# Patient Record
Sex: FEMALE | URBAN - METROPOLITAN AREA
[De-identification: names, ages, dates, MRNs, and addresses within clinical notes are randomized per-mention and may not be internally consistent; named-entity substitution may affect disease eponyms.]

---

## 2024-02-07 ENCOUNTER — MEDICAL CORRESPONDENCE (OUTPATIENT)
Dept: HEALTH INFORMATION MANAGEMENT | Facility: CLINIC | Age: 44
End: 2024-02-07

## 2024-02-08 ENCOUNTER — TELEPHONE (OUTPATIENT)
Dept: CARDIOLOGY | Facility: CLINIC | Age: 44
End: 2024-02-08

## 2024-02-08 DIAGNOSIS — R40.4 SPELL OF ALTERED CONSCIOUSNESS: Primary | ICD-10-CM

## 2024-02-08 NOTE — TELEPHONE ENCOUNTER
02/08/24 EP referral recd from [facility] for possible Tilt Table. Attempted to call pt, no answer and no VM set up.  Faxed records request to [facility] . Awaiting documents  Joel 1 pm

## 2025-02-20 ENCOUNTER — APPOINTMENT (OUTPATIENT)
Dept: ULTRASOUND IMAGING | Facility: CLINIC | Age: 45
End: 2025-02-20
Attending: EMERGENCY MEDICINE
Payer: COMMERCIAL

## 2025-02-20 ENCOUNTER — HOSPITAL ENCOUNTER (OUTPATIENT)
Facility: CLINIC | Age: 45
End: 2025-02-20
Attending: SURGERY | Admitting: SURGERY
Payer: COMMERCIAL

## 2025-02-20 ENCOUNTER — ANESTHESIA EVENT (OUTPATIENT)
Dept: SURGERY | Facility: CLINIC | Age: 45
End: 2025-02-20
Payer: COMMERCIAL

## 2025-02-20 ENCOUNTER — ANESTHESIA (OUTPATIENT)
Dept: SURGERY | Facility: CLINIC | Age: 45
End: 2025-02-20
Payer: COMMERCIAL

## 2025-02-20 ENCOUNTER — HOSPITAL ENCOUNTER (OUTPATIENT)
Facility: CLINIC | Age: 45
Setting detail: OBSERVATION
Discharge: HOME | End: 2025-02-20
Attending: EMERGENCY MEDICINE | Admitting: INTERNAL MEDICINE
Payer: COMMERCIAL

## 2025-02-20 VITALS
SYSTOLIC BLOOD PRESSURE: 110 MMHG | TEMPERATURE: 98.2 F | HEIGHT: 65 IN | RESPIRATION RATE: 14 BRPM | OXYGEN SATURATION: 97 % | BODY MASS INDEX: 29.49 KG/M2 | HEART RATE: 71 BPM | WEIGHT: 177.03 LBS | DIASTOLIC BLOOD PRESSURE: 77 MMHG

## 2025-02-20 DIAGNOSIS — R10.11 RIGHT UPPER QUADRANT ABDOMINAL PAIN: ICD-10-CM

## 2025-02-20 DIAGNOSIS — K81.0 ACUTE CHOLECYSTITIS: ICD-10-CM

## 2025-02-20 DIAGNOSIS — D72.825 BANDEMIA: ICD-10-CM

## 2025-02-20 LAB
ALBUMIN SERPL BCG-MCNC: 4.1 G/DL (ref 3.5–5.2)
ALP SERPL-CCNC: 48 U/L (ref 40–150)
ALT SERPL W P-5'-P-CCNC: 14 U/L (ref 0–50)
ANION GAP SERPL CALCULATED.3IONS-SCNC: 7 MMOL/L (ref 7–15)
AST SERPL W P-5'-P-CCNC: 17 U/L (ref 0–45)
BASOPHILS # BLD AUTO: 0 10E3/UL (ref 0–0.2)
BASOPHILS NFR BLD AUTO: 0 %
BILIRUB SERPL-MCNC: 0.2 MG/DL
BUN SERPL-MCNC: 11.7 MG/DL (ref 6–20)
CALCIUM SERPL-MCNC: 9.2 MG/DL (ref 8.8–10.4)
CHLORIDE SERPL-SCNC: 102 MMOL/L (ref 98–107)
CREAT SERPL-MCNC: 0.63 MG/DL (ref 0.51–0.95)
EGFRCR SERPLBLD CKD-EPI 2021: >90 ML/MIN/1.73M2
EOSINOPHIL # BLD AUTO: 0.2 10E3/UL (ref 0–0.7)
EOSINOPHIL NFR BLD AUTO: 1 %
ERYTHROCYTE [DISTWIDTH] IN BLOOD BY AUTOMATED COUNT: 13.2 % (ref 10–15)
GLUCOSE SERPL-MCNC: 118 MG/DL (ref 70–99)
HCO3 SERPL-SCNC: 24 MMOL/L (ref 22–29)
HCT VFR BLD AUTO: 37.1 % (ref 35–47)
HGB BLD-MCNC: 12.3 G/DL (ref 11.7–15.7)
HOLD SPECIMEN: NORMAL
HOLD SPECIMEN: NORMAL
IMM GRANULOCYTES # BLD: 0.1 10E3/UL
IMM GRANULOCYTES NFR BLD: 0 %
LIPASE SERPL-CCNC: 31 U/L (ref 13–60)
LYMPHOCYTES # BLD AUTO: 1.9 10E3/UL (ref 0.8–5.3)
LYMPHOCYTES NFR BLD AUTO: 15 %
MCH RBC QN AUTO: 27.8 PG (ref 26.5–33)
MCHC RBC AUTO-ENTMCNC: 33.2 G/DL (ref 31.5–36.5)
MCV RBC AUTO: 84 FL (ref 78–100)
MONOCYTES # BLD AUTO: 0.6 10E3/UL (ref 0–1.3)
MONOCYTES NFR BLD AUTO: 5 %
NEUTROPHILS # BLD AUTO: 9.6 10E3/UL (ref 1.6–8.3)
NEUTROPHILS NFR BLD AUTO: 78 %
NRBC # BLD AUTO: 0 10E3/UL
NRBC BLD AUTO-RTO: 0 /100
PLATELET # BLD AUTO: 310 10E3/UL (ref 150–450)
POTASSIUM SERPL-SCNC: 3.7 MMOL/L (ref 3.4–5.3)
PROT SERPL-MCNC: 7.1 G/DL (ref 6.4–8.3)
RBC # BLD AUTO: 4.43 10E6/UL (ref 3.8–5.2)
SODIUM SERPL-SCNC: 133 MMOL/L (ref 135–145)
WBC # BLD AUTO: 12.3 10E3/UL (ref 4–11)

## 2025-02-20 PROCEDURE — 258N000003 HC RX IP 258 OP 636

## 2025-02-20 PROCEDURE — 258N000003 HC RX IP 258 OP 636: Performed by: ANESTHESIOLOGY

## 2025-02-20 PROCEDURE — 85025 COMPLETE CBC W/AUTO DIFF WBC: CPT | Performed by: EMERGENCY MEDICINE

## 2025-02-20 PROCEDURE — 710N000012 HC RECOVERY PHASE 2, PER MINUTE: Performed by: SURGERY

## 2025-02-20 PROCEDURE — 96375 TX/PRO/DX INJ NEW DRUG ADDON: CPT

## 2025-02-20 PROCEDURE — 250N000009 HC RX 250

## 2025-02-20 PROCEDURE — 370N000017 HC ANESTHESIA TECHNICAL FEE, PER MIN: Performed by: SURGERY

## 2025-02-20 PROCEDURE — G0378 HOSPITAL OBSERVATION PER HR: HCPCS

## 2025-02-20 PROCEDURE — 258N000001 HC RX 258: Performed by: SURGERY

## 2025-02-20 PROCEDURE — 96361 HYDRATE IV INFUSION ADD-ON: CPT

## 2025-02-20 PROCEDURE — 250N000025 HC SEVOFLURANE, PER MIN: Performed by: SURGERY

## 2025-02-20 PROCEDURE — 250N000011 HC RX IP 250 OP 636: Performed by: SURGERY

## 2025-02-20 PROCEDURE — 80053 COMPREHEN METABOLIC PANEL: CPT | Performed by: EMERGENCY MEDICINE

## 2025-02-20 PROCEDURE — 250N000013 HC RX MED GY IP 250 OP 250 PS 637: Performed by: ANESTHESIOLOGY

## 2025-02-20 PROCEDURE — 250N000009 HC RX 250: Performed by: SURGERY

## 2025-02-20 PROCEDURE — 360N000076 HC SURGERY LEVEL 3, PER MIN: Performed by: SURGERY

## 2025-02-20 PROCEDURE — 88304 TISSUE EXAM BY PATHOLOGIST: CPT | Mod: TC | Performed by: SURGERY

## 2025-02-20 PROCEDURE — 250N000013 HC RX MED GY IP 250 OP 250 PS 637: Performed by: INTERNAL MEDICINE

## 2025-02-20 PROCEDURE — 47562 LAPAROSCOPIC CHOLECYSTECTOMY: CPT | Mod: AS | Performed by: PHYSICIAN ASSISTANT

## 2025-02-20 PROCEDURE — 47562 LAPAROSCOPIC CHOLECYSTECTOMY: CPT | Performed by: SURGERY

## 2025-02-20 PROCEDURE — 85041 AUTOMATED RBC COUNT: CPT | Performed by: EMERGENCY MEDICINE

## 2025-02-20 PROCEDURE — 999N000141 HC STATISTIC PRE-PROCEDURE NURSING ASSESSMENT: Performed by: SURGERY

## 2025-02-20 PROCEDURE — 83690 ASSAY OF LIPASE: CPT | Performed by: EMERGENCY MEDICINE

## 2025-02-20 PROCEDURE — 85018 HEMOGLOBIN: CPT | Performed by: EMERGENCY MEDICINE

## 2025-02-20 PROCEDURE — 88304 TISSUE EXAM BY PATHOLOGIST: CPT | Mod: 26 | Performed by: PATHOLOGY

## 2025-02-20 PROCEDURE — 99204 OFFICE O/P NEW MOD 45 MIN: CPT | Mod: 57 | Performed by: SURGERY

## 2025-02-20 PROCEDURE — 710N000009 HC RECOVERY PHASE 1, LEVEL 1, PER MIN: Performed by: SURGERY

## 2025-02-20 PROCEDURE — 36415 COLL VENOUS BLD VENIPUNCTURE: CPT | Performed by: EMERGENCY MEDICINE

## 2025-02-20 PROCEDURE — 250N000011 HC RX IP 250 OP 636: Mod: JZ | Performed by: EMERGENCY MEDICINE

## 2025-02-20 PROCEDURE — 96374 THER/PROPH/DIAG INJ IV PUSH: CPT | Mod: 59

## 2025-02-20 PROCEDURE — 99222 1ST HOSP IP/OBS MODERATE 55: CPT | Performed by: INTERNAL MEDICINE

## 2025-02-20 PROCEDURE — 272N000001 HC OR GENERAL SUPPLY STERILE: Performed by: SURGERY

## 2025-02-20 PROCEDURE — 99207 PR APP CREDIT; MD BILLING SHARED VISIT: CPT | Performed by: INTERNAL MEDICINE

## 2025-02-20 PROCEDURE — 258N000003 HC RX IP 258 OP 636: Performed by: EMERGENCY MEDICINE

## 2025-02-20 PROCEDURE — 76705 ECHO EXAM OF ABDOMEN: CPT

## 2025-02-20 PROCEDURE — 99285 EMERGENCY DEPT VISIT HI MDM: CPT | Mod: 25

## 2025-02-20 PROCEDURE — 258N000003 HC RX IP 258 OP 636: Performed by: INTERNAL MEDICINE

## 2025-02-20 PROCEDURE — 250N000011 HC RX IP 250 OP 636: Mod: JZ | Performed by: SURGERY

## 2025-02-20 PROCEDURE — 250N000011 HC RX IP 250 OP 636

## 2025-02-20 RX ORDER — ACETAMINOPHEN 650 MG/1
650 SUPPOSITORY RECTAL EVERY 4 HOURS PRN
Status: DISCONTINUED | OUTPATIENT
Start: 2025-02-20 | End: 2025-02-20 | Stop reason: HOSPADM

## 2025-02-20 RX ORDER — KETOROLAC TROMETHAMINE 15 MG/ML
15 INJECTION, SOLUTION INTRAMUSCULAR; INTRAVENOUS
Status: DISCONTINUED | OUTPATIENT
Start: 2025-02-20 | End: 2025-02-20 | Stop reason: HOSPADM

## 2025-02-20 RX ORDER — BUPIVACAINE HYDROCHLORIDE 5 MG/ML
INJECTION, SOLUTION EPIDURAL; INTRACAUDAL PRN
Status: DISCONTINUED | OUTPATIENT
Start: 2025-02-20 | End: 2025-02-20 | Stop reason: HOSPADM

## 2025-02-20 RX ORDER — ONDANSETRON 4 MG/1
4 TABLET, ORALLY DISINTEGRATING ORAL EVERY 30 MIN PRN
Status: DISCONTINUED | OUTPATIENT
Start: 2025-02-20 | End: 2025-02-20 | Stop reason: HOSPADM

## 2025-02-20 RX ORDER — PROPRANOLOL HYDROCHLORIDE 10 MG/1
10 TABLET ORAL DAILY
Status: DISCONTINUED | OUTPATIENT
Start: 2025-02-20 | End: 2025-02-20 | Stop reason: HOSPADM

## 2025-02-20 RX ORDER — SODIUM CHLORIDE, SODIUM LACTATE, POTASSIUM CHLORIDE, CALCIUM CHLORIDE 600; 310; 30; 20 MG/100ML; MG/100ML; MG/100ML; MG/100ML
INJECTION, SOLUTION INTRAVENOUS CONTINUOUS
Status: DISCONTINUED | OUTPATIENT
Start: 2025-02-20 | End: 2025-02-20 | Stop reason: HOSPADM

## 2025-02-20 RX ORDER — PROPOFOL 10 MG/ML
INJECTION, EMULSION INTRAVENOUS PRN
Status: DISCONTINUED | OUTPATIENT
Start: 2025-02-20 | End: 2025-02-20

## 2025-02-20 RX ORDER — OXYCODONE HYDROCHLORIDE 5 MG/1
5-10 TABLET ORAL EVERY 4 HOURS PRN
Qty: 12 TABLET | Refills: 0 | Status: SHIPPED | OUTPATIENT
Start: 2025-02-20

## 2025-02-20 RX ORDER — LIDOCAINE 40 MG/G
CREAM TOPICAL
Status: DISCONTINUED | OUTPATIENT
Start: 2025-02-20 | End: 2025-02-20 | Stop reason: HOSPADM

## 2025-02-20 RX ORDER — CEFTRIAXONE 2 G/1
2 INJECTION, POWDER, FOR SOLUTION INTRAMUSCULAR; INTRAVENOUS EVERY 24 HOURS
Status: DISCONTINUED | OUTPATIENT
Start: 2025-02-21 | End: 2025-02-20 | Stop reason: HOSPADM

## 2025-02-20 RX ORDER — KETOROLAC TROMETHAMINE 15 MG/ML
10 INJECTION, SOLUTION INTRAMUSCULAR; INTRAVENOUS ONCE
Status: COMPLETED | OUTPATIENT
Start: 2025-02-20 | End: 2025-02-20

## 2025-02-20 RX ORDER — HYDROMORPHONE HYDROCHLORIDE 2 MG/1
2 TABLET ORAL EVERY 4 HOURS PRN
Status: DISCONTINUED | OUTPATIENT
Start: 2025-02-20 | End: 2025-02-20 | Stop reason: HOSPADM

## 2025-02-20 RX ORDER — PROCHLORPERAZINE MALEATE 5 MG/1
10 TABLET ORAL EVERY 6 HOURS PRN
Status: DISCONTINUED | OUTPATIENT
Start: 2025-02-20 | End: 2025-02-20 | Stop reason: HOSPADM

## 2025-02-20 RX ORDER — ACETAMINOPHEN 500 MG
500-1000 TABLET ORAL EVERY 6 HOURS PRN
COMMUNITY
Start: 2025-02-20

## 2025-02-20 RX ORDER — FENTANYL CITRATE 50 UG/ML
25 INJECTION, SOLUTION INTRAMUSCULAR; INTRAVENOUS
Status: DISCONTINUED | OUTPATIENT
Start: 2025-02-20 | End: 2025-02-20 | Stop reason: HOSPADM

## 2025-02-20 RX ORDER — NALOXONE HYDROCHLORIDE 0.4 MG/ML
0.1 INJECTION, SOLUTION INTRAMUSCULAR; INTRAVENOUS; SUBCUTANEOUS
Status: DISCONTINUED | OUTPATIENT
Start: 2025-02-20 | End: 2025-02-20 | Stop reason: HOSPADM

## 2025-02-20 RX ORDER — ONDANSETRON 2 MG/ML
INJECTION INTRAMUSCULAR; INTRAVENOUS PRN
Status: DISCONTINUED | OUTPATIENT
Start: 2025-02-20 | End: 2025-02-20

## 2025-02-20 RX ORDER — ACETAMINOPHEN 325 MG/1
650 TABLET ORAL EVERY 4 HOURS PRN
Status: DISCONTINUED | OUTPATIENT
Start: 2025-02-20 | End: 2025-02-20 | Stop reason: HOSPADM

## 2025-02-20 RX ORDER — HYDROMORPHONE HYDROCHLORIDE 1 MG/ML
0.5 INJECTION, SOLUTION INTRAMUSCULAR; INTRAVENOUS; SUBCUTANEOUS EVERY 30 MIN PRN
Status: DISCONTINUED | OUTPATIENT
Start: 2025-02-20 | End: 2025-02-20

## 2025-02-20 RX ORDER — CEFAZOLIN SODIUM/WATER 2 G/20 ML
2 SYRINGE (ML) INTRAVENOUS
Status: COMPLETED | OUTPATIENT
Start: 2025-02-20 | End: 2025-02-20

## 2025-02-20 RX ORDER — INDOCYANINE GREEN AND WATER 25 MG
2.5 KIT INJECTION ONCE
Status: COMPLETED | OUTPATIENT
Start: 2025-02-20 | End: 2025-02-20

## 2025-02-20 RX ORDER — OXYCODONE HYDROCHLORIDE 5 MG/1
5 TABLET ORAL EVERY 4 HOURS PRN
Status: DISCONTINUED | OUTPATIENT
Start: 2025-02-20 | End: 2025-02-20 | Stop reason: HOSPADM

## 2025-02-20 RX ORDER — HYDROMORPHONE HCL IN WATER/PF 6 MG/30 ML
0.2 PATIENT CONTROLLED ANALGESIA SYRINGE INTRAVENOUS EVERY 5 MIN PRN
Status: DISCONTINUED | OUTPATIENT
Start: 2025-02-20 | End: 2025-02-20 | Stop reason: HOSPADM

## 2025-02-20 RX ORDER — VENLAFAXINE HYDROCHLORIDE 150 MG/1
150 CAPSULE, EXTENDED RELEASE ORAL
Status: DISCONTINUED | OUTPATIENT
Start: 2025-02-20 | End: 2025-02-20 | Stop reason: HOSPADM

## 2025-02-20 RX ORDER — HYDROMORPHONE HCL IN WATER/PF 6 MG/30 ML
0.4 PATIENT CONTROLLED ANALGESIA SYRINGE INTRAVENOUS EVERY 5 MIN PRN
Status: DISCONTINUED | OUTPATIENT
Start: 2025-02-20 | End: 2025-02-20 | Stop reason: HOSPADM

## 2025-02-20 RX ORDER — ONDANSETRON 2 MG/ML
4 INJECTION INTRAMUSCULAR; INTRAVENOUS EVERY 6 HOURS PRN
Status: DISCONTINUED | OUTPATIENT
Start: 2025-02-20 | End: 2025-02-20 | Stop reason: HOSPADM

## 2025-02-20 RX ORDER — AMOXICILLIN 250 MG
1 CAPSULE ORAL 2 TIMES DAILY PRN
Status: DISCONTINUED | OUTPATIENT
Start: 2025-02-20 | End: 2025-02-20 | Stop reason: HOSPADM

## 2025-02-20 RX ORDER — DEXAMETHASONE SODIUM PHOSPHATE 4 MG/ML
INJECTION, SOLUTION INTRA-ARTICULAR; INTRALESIONAL; INTRAMUSCULAR; INTRAVENOUS; SOFT TISSUE PRN
Status: DISCONTINUED | OUTPATIENT
Start: 2025-02-20 | End: 2025-02-20

## 2025-02-20 RX ORDER — ACETAMINOPHEN 325 MG/1
975 TABLET ORAL ONCE
Status: COMPLETED | OUTPATIENT
Start: 2025-02-20 | End: 2025-02-20

## 2025-02-20 RX ORDER — ONDANSETRON 2 MG/ML
4 INJECTION INTRAMUSCULAR; INTRAVENOUS EVERY 30 MIN PRN
Status: DISCONTINUED | OUTPATIENT
Start: 2025-02-20 | End: 2025-02-20 | Stop reason: HOSPADM

## 2025-02-20 RX ORDER — ACETAMINOPHEN 325 MG/1
975 TABLET ORAL
Status: DISCONTINUED | OUTPATIENT
Start: 2025-02-20 | End: 2025-02-20 | Stop reason: HOSPADM

## 2025-02-20 RX ORDER — FENTANYL CITRATE 50 UG/ML
INJECTION, SOLUTION INTRAMUSCULAR; INTRAVENOUS PRN
Status: DISCONTINUED | OUTPATIENT
Start: 2025-02-20 | End: 2025-02-20

## 2025-02-20 RX ORDER — OXYCODONE HYDROCHLORIDE 5 MG/1
5 TABLET ORAL
Status: DISCONTINUED | OUTPATIENT
Start: 2025-02-20 | End: 2025-02-20 | Stop reason: HOSPADM

## 2025-02-20 RX ORDER — ACETAMINOPHEN 325 MG/1
650 TABLET ORAL
Status: DISCONTINUED | OUTPATIENT
Start: 2025-02-20 | End: 2025-02-20 | Stop reason: HOSPADM

## 2025-02-20 RX ORDER — DEXAMETHASONE SODIUM PHOSPHATE 4 MG/ML
4 INJECTION, SOLUTION INTRA-ARTICULAR; INTRALESIONAL; INTRAMUSCULAR; INTRAVENOUS; SOFT TISSUE
Status: DISCONTINUED | OUTPATIENT
Start: 2025-02-20 | End: 2025-02-20 | Stop reason: HOSPADM

## 2025-02-20 RX ORDER — POLYETHYLENE GLYCOL 3350 17 G
2 POWDER IN PACKET (EA) ORAL
Status: DISCONTINUED | OUTPATIENT
Start: 2025-02-20 | End: 2025-02-20 | Stop reason: HOSPADM

## 2025-02-20 RX ORDER — LABETALOL HYDROCHLORIDE 5 MG/ML
10 INJECTION, SOLUTION INTRAVENOUS
Status: DISCONTINUED | OUTPATIENT
Start: 2025-02-20 | End: 2025-02-20 | Stop reason: HOSPADM

## 2025-02-20 RX ORDER — CEFTRIAXONE 1 G/1
1 INJECTION, POWDER, FOR SOLUTION INTRAMUSCULAR; INTRAVENOUS ONCE
Status: DISCONTINUED | OUTPATIENT
Start: 2025-02-20 | End: 2025-02-20

## 2025-02-20 RX ORDER — LIDOCAINE HYDROCHLORIDE 20 MG/ML
INJECTION, SOLUTION INFILTRATION; PERINEURAL PRN
Status: DISCONTINUED | OUTPATIENT
Start: 2025-02-20 | End: 2025-02-20

## 2025-02-20 RX ORDER — HYDROMORPHONE HYDROCHLORIDE 1 MG/ML
0.5 INJECTION, SOLUTION INTRAMUSCULAR; INTRAVENOUS; SUBCUTANEOUS EVERY 4 HOURS PRN
Status: DISCONTINUED | OUTPATIENT
Start: 2025-02-20 | End: 2025-02-20 | Stop reason: HOSPADM

## 2025-02-20 RX ORDER — BUPROPION HYDROCHLORIDE 150 MG/1
150 TABLET ORAL DAILY
Status: DISCONTINUED | OUTPATIENT
Start: 2025-02-20 | End: 2025-02-20 | Stop reason: HOSPADM

## 2025-02-20 RX ORDER — KETOROLAC TROMETHAMINE 30 MG/ML
INJECTION, SOLUTION INTRAMUSCULAR; INTRAVENOUS PRN
Status: DISCONTINUED | OUTPATIENT
Start: 2025-02-20 | End: 2025-02-20

## 2025-02-20 RX ORDER — GLYCOPYRROLATE 0.2 MG/ML
INJECTION, SOLUTION INTRAMUSCULAR; INTRAVENOUS PRN
Status: DISCONTINUED | OUTPATIENT
Start: 2025-02-20 | End: 2025-02-20

## 2025-02-20 RX ORDER — ALBUTEROL SULFATE 0.83 MG/ML
2.5 SOLUTION RESPIRATORY (INHALATION) EVERY 4 HOURS PRN
Status: DISCONTINUED | OUTPATIENT
Start: 2025-02-20 | End: 2025-02-20 | Stop reason: HOSPADM

## 2025-02-20 RX ORDER — ONDANSETRON 4 MG/1
4 TABLET, ORALLY DISINTEGRATING ORAL EVERY 6 HOURS PRN
Status: DISCONTINUED | OUTPATIENT
Start: 2025-02-20 | End: 2025-02-20 | Stop reason: HOSPADM

## 2025-02-20 RX ORDER — AMOXICILLIN 250 MG
2 CAPSULE ORAL 2 TIMES DAILY PRN
Status: DISCONTINUED | OUTPATIENT
Start: 2025-02-20 | End: 2025-02-20 | Stop reason: HOSPADM

## 2025-02-20 RX ORDER — CEFAZOLIN SODIUM/WATER 2 G/20 ML
2 SYRINGE (ML) INTRAVENOUS SEE ADMIN INSTRUCTIONS
Status: DISCONTINUED | OUTPATIENT
Start: 2025-02-20 | End: 2025-02-20 | Stop reason: HOSPADM

## 2025-02-20 RX ORDER — FENTANYL CITRATE 50 UG/ML
50 INJECTION, SOLUTION INTRAMUSCULAR; INTRAVENOUS EVERY 5 MIN PRN
Status: DISCONTINUED | OUTPATIENT
Start: 2025-02-20 | End: 2025-02-20 | Stop reason: HOSPADM

## 2025-02-20 RX ORDER — IBUPROFEN 200 MG
600 TABLET ORAL EVERY 6 HOURS PRN
COMMUNITY
Start: 2025-02-20

## 2025-02-20 RX ORDER — FENTANYL CITRATE 50 UG/ML
25 INJECTION, SOLUTION INTRAMUSCULAR; INTRAVENOUS EVERY 5 MIN PRN
Status: DISCONTINUED | OUTPATIENT
Start: 2025-02-20 | End: 2025-02-20 | Stop reason: HOSPADM

## 2025-02-20 RX ADMIN — FENTANYL CITRATE 50 MCG: 50 INJECTION INTRAMUSCULAR; INTRAVENOUS at 10:18

## 2025-02-20 RX ADMIN — PROPOFOL 160 MG: 10 INJECTION, EMULSION INTRAVENOUS at 09:33

## 2025-02-20 RX ADMIN — INDOCYANINE GREEN AND WATER 2.5 MG: KIT at 08:56

## 2025-02-20 RX ADMIN — PHENYLEPHRINE HYDROCHLORIDE 200 MCG: 10 INJECTION INTRAVENOUS at 09:55

## 2025-02-20 RX ADMIN — KETOROLAC TROMETHAMINE 10 MG: 15 INJECTION, SOLUTION INTRAMUSCULAR; INTRAVENOUS at 03:08

## 2025-02-20 RX ADMIN — SODIUM CHLORIDE, POTASSIUM CHLORIDE, SODIUM LACTATE AND CALCIUM CHLORIDE: 600; 310; 30; 20 INJECTION, SOLUTION INTRAVENOUS at 05:44

## 2025-02-20 RX ADMIN — ONDANSETRON 4 MG: 2 INJECTION INTRAMUSCULAR; INTRAVENOUS at 09:33

## 2025-02-20 RX ADMIN — ACETAMINOPHEN 975 MG: 325 TABLET, FILM COATED ORAL at 08:58

## 2025-02-20 RX ADMIN — SODIUM CHLORIDE 1000 ML: 9 INJECTION, SOLUTION INTRAVENOUS at 03:05

## 2025-02-20 RX ADMIN — FENTANYL CITRATE 50 MCG: 50 INJECTION INTRAMUSCULAR; INTRAVENOUS at 10:20

## 2025-02-20 RX ADMIN — SUGAMMADEX 200 MG: 100 INJECTION, SOLUTION INTRAVENOUS at 10:14

## 2025-02-20 RX ADMIN — KETOROLAC TROMETHAMINE 15 MG: 30 INJECTION, SOLUTION INTRAMUSCULAR at 09:33

## 2025-02-20 RX ADMIN — ROCURONIUM BROMIDE 50 MG: 50 INJECTION, SOLUTION INTRAVENOUS at 09:33

## 2025-02-20 RX ADMIN — GLYCOPYRROLATE 0.2 MG: 0.2 INJECTION, SOLUTION INTRAMUSCULAR; INTRAVENOUS at 09:47

## 2025-02-20 RX ADMIN — LIDOCAINE HYDROCHLORIDE 50 MG: 20 INJECTION, SOLUTION INFILTRATION; PERINEURAL at 09:33

## 2025-02-20 RX ADMIN — DEXAMETHASONE SODIUM PHOSPHATE 8 MG: 4 INJECTION, SOLUTION INTRA-ARTICULAR; INTRALESIONAL; INTRAMUSCULAR; INTRAVENOUS; SOFT TISSUE at 09:33

## 2025-02-20 RX ADMIN — Medication 2 G: at 09:22

## 2025-02-20 RX ADMIN — HYDROMORPHONE HYDROCHLORIDE 2 MG: 2 TABLET ORAL at 05:26

## 2025-02-20 RX ADMIN — DEXMEDETOMIDINE HYDROCHLORIDE 0.5 MCG/KG/HR: 100 INJECTION, SOLUTION INTRAVENOUS at 09:35

## 2025-02-20 RX ADMIN — FENTANYL CITRATE 100 MCG: 50 INJECTION INTRAMUSCULAR; INTRAVENOUS at 09:33

## 2025-02-20 RX ADMIN — HYDROMORPHONE HYDROCHLORIDE 0.5 MG: 1 INJECTION, SOLUTION INTRAMUSCULAR; INTRAVENOUS; SUBCUTANEOUS at 03:35

## 2025-02-20 RX ADMIN — MIDAZOLAM 2 MG: 1 INJECTION INTRAMUSCULAR; INTRAVENOUS at 09:22

## 2025-02-20 RX ADMIN — SODIUM CHLORIDE, POTASSIUM CHLORIDE, SODIUM LACTATE AND CALCIUM CHLORIDE: 600; 310; 30; 20 INJECTION, SOLUTION INTRAVENOUS at 09:16

## 2025-02-20 ASSESSMENT — ACTIVITIES OF DAILY LIVING (ADL)
ADLS_ACUITY_SCORE: 25
ADLS_ACUITY_SCORE: 25
ADLS_ACUITY_SCORE: 23
ADLS_ACUITY_SCORE: 41
ADLS_ACUITY_SCORE: 41
ADLS_ACUITY_SCORE: 25
ADLS_ACUITY_SCORE: 41
ADLS_ACUITY_SCORE: 23
ADLS_ACUITY_SCORE: 25
ADLS_ACUITY_SCORE: 23

## 2025-02-20 ASSESSMENT — COPD QUESTIONNAIRES: COPD: 0

## 2025-02-20 ASSESSMENT — COLUMBIA-SUICIDE SEVERITY RATING SCALE - C-SSRS
1. IN THE PAST MONTH, HAVE YOU WISHED YOU WERE DEAD OR WISHED YOU COULD GO TO SLEEP AND NOT WAKE UP?: NO
2. HAVE YOU ACTUALLY HAD ANY THOUGHTS OF KILLING YOURSELF IN THE PAST MONTH?: NO
6. HAVE YOU EVER DONE ANYTHING, STARTED TO DO ANYTHING, OR PREPARED TO DO ANYTHING TO END YOUR LIFE?: NO

## 2025-02-20 ASSESSMENT — LIFESTYLE VARIABLES: TOBACCO_USE: 1

## 2025-02-20 NOTE — CONSULTS
General Surgery Consultation     Sola Brannon MRN# [id number]   Age: 44 year old YOB: 1980      Date of Admission:                  2/20/2025          Reason for consult:            Abdominal pain, right upper quadrant           Requesting physician:            Negrito Interiano MD                       Assessment and Plan:   Assessment:    Sola Brannon is a 44 year old female with acute cholecystitis.          Plan:    I have offered the patient a laparoscopic cholecystectomy.      We have discussed the indication, alternatives, risks and expected recovery for laparoscopic cholecystectomy.  Specifically we have discussed incisions, general anesthesia, potential postoperative infections, bleeding, open conversion, common bile duct injury, injury to intra-abdominal organs, adhesions leading to bowel obstruction, retained common bile duct stone, bile leak, DVT, PE, hernia, post cholecystectomy diarrhea, as well as expected recovery, postoperative dietary restrictions and physical limitations.  We have discussed the recommended interventions and treatments for complications.  All questions have been answered to the best of my ability.     She elects to proceed with surgery.        Anna Riley MD             Chief Complaint:   Abdominal pain, right upper quadrant     History is obtained from the patient.          History of Present Illness:   Sola Brannon is a 44 year old female who presents with right upper quadrant abdominal pain for the past 12 hours.  The pain is constant.  She has had similar pain in the past that was less severe.  There is not an association with eating any type of food.  Positive for associated symptoms of nausea.  She  does not have a history of jaundice or dark urine.  She  has not had pancreatitis in the past.              Past Medical History:    has a past medical history of LIZBETH (generalized anxiety disorder) and MDD (major depressive disorder).           Past Surgical History:      Past Surgical History         Past Surgical History:   Procedure Laterality Date    C/SECTION, CLASSICAL                     Social History:      Social History           Tobacco Use    Smoking status: Never    Smokeless tobacco: Never   Substance Use Topics    Alcohol use: Not Currently              Family History:   Family history reviewed and is not pertinent.          Allergies:      Allergies        Allergies   Allergen Reactions    Amoxicillin Rash    Erythromycin Diarrhea, GI Disturbance, Nausea and Nausea and Vomiting                 Medications:      Medications Ordered Prior to Encounter             Current Facility-Administered Medications   Medication Dose Route Frequency Provider Last Rate Last Admin    [Auto Hold] acetaminophen (TYLENOL) tablet 650 mg  650 mg Oral Q4H PRN Negrito Interiano MD         Or    [Auto Hold] acetaminophen (TYLENOL) Suppository 650 mg  650 mg Rectal Q4H PRN Negrito Interiano MD        [Auto Hold] buPROPion (WELLBUTRIN XL) 24 hr tablet 150 mg  150 mg Oral Daily Negrito Interiano MD        ceFAZolin Sodium (ANCEF) injection 2 g  2 g Intravenous Pre-Op/Pre-procedure x 1 dose Anna Riley MD        ceFAZolin Sodium (ANCEF) injection 2 g  2 g Intravenous See Admin Instructions Anna Riley MD        [Auto Hold] cefTRIAXone (ROCEPHIN) 2 g vial to attach to  ml bag for ADULTS or NS 50 ml bag for PEDS  2 g Intravenous Q24H Negrito Interiano MD        [Auto Hold] HYDROmorphone (DILAUDID) tablet 2 mg  2 mg Oral Q4H PRN Negrito Interiano MD   2 mg at 02/20/25 0526    [Auto Hold] HYDROmorphone (PF) (DILAUDID) injection 0.5 mg  0.5 mg Intravenous Q4H PRN Negrito Interiano MD        lactated ringers infusion   Intravenous Continuous Negrito Interiano  mL/hr at 02/20/25 0544 New Bag at 02/20/25 0544    lactated ringers infusion   Intravenous Continuous  Lis Luciano MD        lidocaine (LMX4) cream   Topical Q1H PRN Lis Luciano MD        lidocaine 1 % 0.1-1 mL  0.1-1 mL Other Q1H PRN Lis Luciano MD        [Auto Hold] melatonin tablet 5 mg  5 mg Oral At Bedtime PRN Negrito Interiano MD        [Auto Hold] nicotine (COMMIT) lozenge 2 mg  2 mg Buccal Q1H PRN Negrito Interiano MD        [Auto Hold] ondansetron (ZOFRAN ODT) ODT tab 4 mg  4 mg Oral Q6H PRN Negrito Interiano MD         Or    [Auto Hold] ondansetron (ZOFRAN) injection 4 mg  4 mg Intravenous Q6H PRN Negrito Interiano MD        [Auto Hold] ondansetron (ZOFRAN) injection 4 mg  4 mg Intravenous Q30 Min PRN Diamond Whaley MD        [Auto Hold] prochlorperazine (COMPAZINE) injection 10 mg  10 mg Intravenous Q6H PRN Negrito Interiano MD         Or    [Auto Hold] prochlorperazine (COMPAZINE) tablet 10 mg  10 mg Oral Q6H PRN Negrito Interiano MD        [Auto Hold] propranolol (INDERAL) tablet 10 mg  10 mg Oral Daily Negrito Interiano MD        [Auto Hold] senna-docusate (SENOKOT-S/PERICOLACE) 8.6-50 MG per tablet 1 tablet  1 tablet Oral BID PRN Negrito Interiano MD         Or    [Auto Hold] senna-docusate (SENOKOT-S/PERICOLACE) 8.6-50 MG per tablet 2 tablet  2 tablet Oral BID PRN Negrito Interiano MD        sodium chloride (PF) 0.9% PF flush 3 mL  3 mL Intracatheter Q8H Lis Luciano MD        sodium chloride (PF) 0.9% PF flush 3 mL  3 mL Intracatheter q1 min prn Lis Luciano MD        [Auto Hold] venlafaxine (EFFEXOR XR) 24 hr capsule 150 mg  150 mg Oral Daily with breakfast Negrito Interiano MD             Inpatient Administered Meds             Current Facility-Administered Medications   Medication Dose Route Frequency Provider Last Rate Last Admin    [Auto Hold] buPROPion (WELLBUTRIN XL) 24 hr tablet 150 mg  150 mg Oral Daily Laisha,  "Negrito LARIOS MD        ceFAZolin Sodium (ANCEF) injection 2 g  2 g Intravenous Pre-Op/Pre-procedure x 1 dose Anna Riley MD        ceFAZolin Sodium (ANCEF) injection 2 g  2 g Intravenous See Admin Instructions Anna Riley MD        [Auto Hold] cefTRIAXone (ROCEPHIN) 2 g vial to attach to  ml bag for ADULTS or NS 50 ml bag for PEDS  2 g Intravenous Q24H Negrito Interiano MD        [Auto Hold] propranolol (INDERAL) tablet 10 mg  10 mg Oral Daily Negrito Interiano MD        sodium chloride (PF) 0.9% PF flush 3 mL  3 mL Intracatheter Q8H Lis Luciano MD        [Auto Hold] venlafaxine (EFFEXOR XR) 24 hr capsule 150 mg  150 mg Oral Daily with breakfast Negrito Interiano MD                     Review of Systems:   The 10 point review of systems is negative other than noted in the HPI.          Physical Exam:   /86   Pulse 83   Temp 99.4  F (37.4  C) (Temporal)   Resp 17   Ht 1.651 m (5' 5\")   Wt 80.3 kg (177 lb 0.5 oz)   LMP 01/24/2025 (Approximate)   SpO2 98%   BMI 29.46 kg/m    General - Well developed, well nourished female in no apparent distress  HEENT:  Head normocephalic and atraumatic, pupils equal and round, conjunctivae clear, no scleral icterus, mucous membranes moist, external ears and nose normal  Neck: Supple without thyromegaly or masses  Lymphatic: No cervical, or supraclavicular lymphadenopathy  Lungs: Clear to auscultation bilaterally  Heart: regular rate and rhythm, no murmurs  Abdomen:              soft, flat, non-distended with moderate tenderness noted in the right upper quadrant . no masses palpated  Extremities: Warm without edema  Neurologic: nonfocal  Psychiatric: Mood and affect appropriate  Skin: Without lesions, rashes, or jaundice          Data:      WBC -         Lab Results   Component Value Date     WBC 12.3 (H) 02/20/2025         HgB -         Lab Results   Component Value Date     HGB 12.3 02/20/2025       "   Plt-         Lab Results   Component Value Date      02/20/2025         Liver Function Studies -       Recent Labs   Lab Test 02/20/25  0309   PROTTOTAL 7.1   ALBUMIN 4.1   BILITOTAL 0.2   ALKPHOS 48   AST 17   ALT 14         Lipase-         Lab Results   Component Value Date     LIPASE 31 02/20/2025            Imaging:  All imaging studies reviewed by me.         Results for orders placed or performed during the hospital encounter of 02/20/25   US Abdomen Limited (RUQ)     Narrative     EXAM: US ABDOMEN LIMITED  LOCATION: [facility]  DATE: 2/20/2025     INDICATION: ruq pain  COMPARISON: None.  TECHNIQUE: Limited abdominal ultrasound.     FINDINGS:     GALLBLADDER: Cholelithiasis. The gallbladder is distended. Mild wall thickening, 4 mm. Sonographic Araiza's sign indeterminate due to medication.     BILE DUCTS: No biliary dilatation. The common duct measures 4 mm.     LIVER: Grossly within normal limits where seen. The portal vein is patent with flow in the normal direction.     RIGHT KIDNEY: No hydronephrosis.     PANCREAS: Obscured by bowel gas.     No visible ascites.        Impression     IMPRESSION:  1.  Cholelithiasis and gallbladder distention. Mild gallbladder wall thickening.  2.  Sonographic Araiza's sign indeterminate due to pain medication. Correlate to exclude acute cholecystitis.  3.  No biliary dilatation.               This note was created using voice recognition software. Undetected word substitutions or other errors may have occurred.      Time spent with the patient, reviewing the EMR, reviewing laboratory and imaging studies, more than 50% of which was counseling and coordinating care:  40 minutes.     Anna Riley MD

## 2025-02-20 NOTE — OP NOTE
General Surgery Consultation    Sola Brannon MRN# [id number]   Age: 44 year old YOB: 1980     Date of Admission:  2/20/2025    Reason for consult:            Abdominal pain, right upper quadrant       Requesting physician:            Negrito Interiano MD                Assessment and Plan:   Assessment:   Sola Brannon is a 44 year old female with acute cholecystitis.         Plan:   I have offered the patient a laparoscopic cholecystectomy.     We have discussed the indication, alternatives, risks and expected recovery for laparoscopic cholecystectomy.  Specifically we have discussed incisions, general anesthesia, potential postoperative infections, bleeding, open conversion, common bile duct injury, injury to intra-abdominal organs, adhesions leading to bowel obstruction, retained common bile duct stone, bile leak, DVT, PE, hernia, post cholecystectomy diarrhea, as well as expected recovery, postoperative dietary restrictions and physical limitations.  We have discussed the recommended interventions and treatments for complications.  All questions have been answered to the best of my ability.    She elects to proceed with surgery.       Anna Riley MD           Chief Complaint:   Abdominal pain, right upper quadrant     History is obtained from the patient.         History of Present Illness:   Sola Brannon is a 44 year old female who presents with right upper quadrant abdominal pain for the past 12 hours.  The pain is constant.  She has had similar pain in the past that was less severe.  There is not an association with eating any type of food.  Positive for associated symptoms of nausea.  She  does not have a history of jaundice or dark urine.  She  has not had pancreatitis in the past.              Past Medical History:    has a past medical history of LIZBETH (generalized anxiety disorder) and MDD (major depressive disorder).          Past Surgical History:     Past  Surgical History:   Procedure Laterality Date    C/SECTION, CLASSICAL               Social History:     Social History     Tobacco Use    Smoking status: Never    Smokeless tobacco: Never   Substance Use Topics    Alcohol use: Not Currently             Family History:   Family history reviewed and is not pertinent.         Allergies:     Allergies   Allergen Reactions    Amoxicillin Rash    Erythromycin Diarrhea, GI Disturbance, Nausea and Nausea and Vomiting             Medications:     Current Facility-Administered Medications   Medication Dose Route Frequency Provider Last Rate Last Admin    [Auto Hold] acetaminophen (TYLENOL) tablet 650 mg  650 mg Oral Q4H PRN Negrito Interiano MD        Or    [Auto Hold] acetaminophen (TYLENOL) Suppository 650 mg  650 mg Rectal Q4H PRN Negrito Interiano MD        [Auto Hold] buPROPion (WELLBUTRIN XL) 24 hr tablet 150 mg  150 mg Oral Daily Negrito Interiano MD        ceFAZolin Sodium (ANCEF) injection 2 g  2 g Intravenous Pre-Op/Pre-procedure x 1 dose Anna Riley MD        ceFAZolin Sodium (ANCEF) injection 2 g  2 g Intravenous See Admin Instructions Anna Riley MD        [Auto Hold] cefTRIAXone (ROCEPHIN) 2 g vial to attach to  ml bag for ADULTS or NS 50 ml bag for PEDS  2 g Intravenous Q24H Negrito Interiano MD        [Auto Hold] HYDROmorphone (DILAUDID) tablet 2 mg  2 mg Oral Q4H PRN Negrito Interiano MD   2 mg at 02/20/25 0526    [Auto Hold] HYDROmorphone (PF) (DILAUDID) injection 0.5 mg  0.5 mg Intravenous Q4H PRN Negrito Interiano MD        lactated ringers infusion   Intravenous Continuous Negrito Interiano  mL/hr at 02/20/25 0544 New Bag at 02/20/25 0544    lactated ringers infusion   Intravenous Continuous Lis Luciano MD        lidocaine (LMX4) cream   Topical Q1H PRN Lis Luciano MD        lidocaine 1 % 0.1-1 mL  0.1-1 mL Other Q1H PRN  Lis Luciano MD        [Auto Hold] melatonin tablet 5 mg  5 mg Oral At Bedtime PRN Negrito Interiano MD        [Auto Hold] nicotine (COMMIT) lozenge 2 mg  2 mg Buccal Q1H PRN Negrito Interiano MD        [Auto Hold] ondansetron (ZOFRAN ODT) ODT tab 4 mg  4 mg Oral Q6H PRN Negrito Interiano MD        Or    [Auto Hold] ondansetron (ZOFRAN) injection 4 mg  4 mg Intravenous Q6H PRN Negrito Interiano MD        [Auto Hold] ondansetron (ZOFRAN) injection 4 mg  4 mg Intravenous Q30 Min PRN Diamond Whaley MD        [Auto Hold] prochlorperazine (COMPAZINE) injection 10 mg  10 mg Intravenous Q6H PRN Negrito Interiano MD        Or    [Auto Hold] prochlorperazine (COMPAZINE) tablet 10 mg  10 mg Oral Q6H PRN Negrito Interiano MD        [Auto Hold] propranolol (INDERAL) tablet 10 mg  10 mg Oral Daily Negrito Interiano MD        [Auto Hold] senna-docusate (SENOKOT-S/PERICOLACE) 8.6-50 MG per tablet 1 tablet  1 tablet Oral BID PRN Negrito Interiano MD        Or    [Auto Hold] senna-docusate (SENOKOT-S/PERICOLACE) 8.6-50 MG per tablet 2 tablet  2 tablet Oral BID PRN Negrito Interiano MD        sodium chloride (PF) 0.9% PF flush 3 mL  3 mL Intracatheter Q8H Lis Luciano MD        sodium chloride (PF) 0.9% PF flush 3 mL  3 mL Intracatheter q1 min prn Lis Luciano MD        [Auto Hold] venlafaxine (EFFEXOR XR) 24 hr capsule 150 mg  150 mg Oral Daily with breakfast Negrito Interiano MD         Current Facility-Administered Medications   Medication Dose Route Frequency Provider Last Rate Last Admin    [Auto Hold] buPROPion (WELLBUTRIN XL) 24 hr tablet 150 mg  150 mg Oral Daily Negrito Interiano MD        ceFAZolin Sodium (ANCEF) injection 2 g  2 g Intravenous Pre-Op/Pre-procedure x 1 dose Anna Riley MD        ceFAZolin Sodium (ANCEF) injection 2 g  2 g Intravenous See Admin  "Instructions Anna Riley MD        [Auto Hold] cefTRIAXone (ROCEPHIN) 2 g vial to attach to  ml bag for ADULTS or NS 50 ml bag for PEDS  2 g Intravenous Q24H Negrito Interiano MD        [Auto Hold] propranolol (INDERAL) tablet 10 mg  10 mg Oral Daily Negrito Interiano MD        sodium chloride (PF) 0.9% PF flush 3 mL  3 mL Intracatheter Q8H Lis Luciano MD        [Auto Hold] venlafaxine (EFFEXOR XR) 24 hr capsule 150 mg  150 mg Oral Daily with breakfast Negrito Interiano MD                Review of Systems:   The 10 point review of systems is negative other than noted in the HPI.          Physical Exam:   /86   Pulse 83   Temp 99.4  F (37.4  C) (Temporal)   Resp 17   Ht 1.651 m (5' 5\")   Wt 80.3 kg (177 lb 0.5 oz)   LMP 01/24/2025 (Approximate)   SpO2 98%   BMI 29.46 kg/m    General - Well developed, well nourished female in no apparent distress  HEENT:  Head normocephalic and atraumatic, pupils equal and round, conjunctivae clear, no scleral icterus, mucous membranes moist, external ears and nose normal  Neck: Supple without thyromegaly or masses  Lymphatic: No cervical, or supraclavicular lymphadenopathy  Lungs: Clear to auscultation bilaterally  Heart: regular rate and rhythm, no murmurs  Abdomen:   soft, flat, non-distended with moderate tenderness noted in the right upper quadrant . no masses palpated  Extremities: Warm without edema  Neurologic: nonfocal  Psychiatric: Mood and affect appropriate  Skin: Without lesions, rashes, or jaundice         Data:     WBC -   Lab Results   Component Value Date    WBC 12.3 (H) 02/20/2025       HgB -   Lab Results   Component Value Date    HGB 12.3 02/20/2025       Plt-   Lab Results   Component Value Date     02/20/2025       Liver Function Studies -   Recent Labs   Lab Test 02/20/25  0309   PROTTOTAL 7.1   ALBUMIN 4.1   BILITOTAL 0.2   ALKPHOS 48   AST 17   ALT 14       Lipase-   Lab Results "   Component Value Date    LIPASE 31 02/20/2025         Imaging:  All imaging studies reviewed by me.    Results for orders placed or performed during the hospital encounter of 02/20/25   US Abdomen Limited (RUQ)    Narrative    EXAM: US ABDOMEN LIMITED  LOCATION: [facility]  DATE: 2/20/2025    INDICATION: ruq pain  COMPARISON: None.  TECHNIQUE: Limited abdominal ultrasound.    FINDINGS:    GALLBLADDER: Cholelithiasis. The gallbladder is distended. Mild wall thickening, 4 mm. Sonographic Araiza's sign indeterminate due to medication.    BILE DUCTS: No biliary dilatation. The common duct measures 4 mm.    LIVER: Grossly within normal limits where seen. The portal vein is patent with flow in the normal direction.    RIGHT KIDNEY: No hydronephrosis.    PANCREAS: Obscured by bowel gas.    No visible ascites.      Impression    IMPRESSION:  1.  Cholelithiasis and gallbladder distention. Mild gallbladder wall thickening.  2.  Sonographic Araiza's sign indeterminate due to pain medication. Correlate to exclude acute cholecystitis.  3.  No biliary dilatation.           This note was created using voice recognition software. Undetected word substitutions or other errors may have occurred.     Time spent with the patient, reviewing the EMR, reviewing laboratory and imaging studies, more than 50% of which was counseling and coordinating care:  40 minutes.     Anna Riley MD

## 2025-02-20 NOTE — OP NOTE
General Surgery Operative Note    PREOPERATIVE DIAGNOSIS:  Right upper quadrant abdominal pain [R10.11]    POSTOPERATIVE DIAGNOSIS:  Same, acute cholecystitis    PROCEDURE:  Laparoscopic Cholecystectomy    ANESTHESIA:  General    PREOPERATIVE MEDICATIONS:  Ancef IV.    SURGEON:  Anna Riley MD    ASSISTANT:  Latha Arce PA-C, The Physician Assistant was medically necessary for their expertise in prepping, camera management, suctioning, suturing and retraction.      ESTIMATED BLOOD LOSS:  20 ml    INDICATIONS:  Sola Brannon is a 44 year old female who has been experiencing episodes of RUQ abdominal pain for the past day associated with nausea.  Abdominal imaging has revealed gallstones.  She now presents for laparoscopic cholecystectomy after having risks and benefits reviewed in detail.    PROCEDURE:   An infraumbilical incision was made and the abdomen was entered using a Alvina trocar technique.  Secondary trocars were placed under laparoscopic guidance.  We proceeded in the usual fashion first finding the gallbladder neck cystic duct junction.  The cystic duct was then identified and cleared of inflammatory adhesions. The cystic artery was similarly identified.  Once a critical window of safety was achieved, the cystic duct was triply clipped and divided.  The cystic artery was also triply clipped and divided. The gallbladder was removed from the gallbladder bed using cautery.  Once free, the gallbladder was extracted from the infraumbilical site in an EndoCatch bag.  The liver bed was inspected for hemostasis, which was ensured.  Trocar sites were then infiltrated with 0.5% Marcaine plain. The infraumbilical fascial opening was closed with interrupted 0 Vicryl. The skin was closed using 4-0 subcuticular vicryl. Steri-Strips were placed on the incisions. The patient was transferred to recovery in good condition.        INTRAOPERATIVE FINDINGS: moderately inflamed, edematous, distended  gallbladder    Specimens:   ID Type Source Tests Collected by Time Destination   1 : gallbladder and contents Tissue Gallbladder SURGICAL PATHOLOGY EXAM Anna Riley MD 2/20/2025 10:13 AM        Anna Riley MD

## 2025-02-20 NOTE — ED TRIAGE NOTES
Arrives from home with . Complaints of epigastric pain that started around 2000 last night. Went to bed around 2130, but unable to sleep due to intensity increasing. Pt states the pain radiates into her mid back/shoulder blade, and has some low back pain. Pt states took 4 pepto's without any relief. Denies having history of gallstones or GERD. Pain 7/10. Denies having any nausea.     Triage Assessment (Adult)       Row Name 02/20/25 0224          Triage Assessment    Airway WDL WDL        Respiratory WDL    Respiratory WDL WDL        Skin Circulation/Temperature WDL    Skin Circulation/Temperature WDL WDL        Cardiac WDL    Cardiac WDL WDL        Peripheral/Neurovascular WDL    Peripheral Neurovascular WDL WDL        Cognitive/Neuro/Behavioral WDL    Cognitive/Neuro/Behavioral WDL WDL

## 2025-02-20 NOTE — DISCHARGE SUMMARY
[facility]  Discharge Summary  Hospitalist      Date of Admission:  2/20/2025  Date of Discharge:  2/20/2025  Provider:  Caitlin Seaman MD  Date of Service (when I last saw the patient): 02/20/25      Primary Provider: Clinic, [facility]          Discharge Diagnosis:     Discharge Diagnoses   Acute pancreatitis s/post lap eliezer 2/20/25    Other medical issues:  Past Medical History:   Diagnosis Date    LIZBETH (generalized anxiety disorder)     MDD (major depressive disorder)           History of Present Illness   Sola Brannon is an 44 year old female who presented with right upper quadrant abdominal.  Please see the admission history and physical for full details.    Hospital Course     Sola Brannon was admitted on 2/20/2025.  The following problems were addressed during her hospitalization:    Acute Cholecystitis  -Pain regimen in place  -Ceftriaxone 2 g daily prior to surgery  -Status post lap eliezer  -Leukocytosis follow-up as outpatient primary care provider to recheck labs     MDD, LIZBETH, & PTSD  -Resumed home meds     Hyponatremia  -Likely from dehydration.  Mild expect to improve follow-up as outpatient  -PCP to recheck labs       Significant Results and Procedures   As noted above    Pending Results   Unresulted Labs Ordered in the Past 30 Days of this Admission       Date and Time Order Name Status Description    2/20/2025 10:14 AM Surgical Pathology Exam In process             Code Status   Full Code       Primary Care Physician   [facility]    Physical Exam   Temp: 97  F (36.1  C) Temp src: Temporal BP: 113/72 Pulse: 73   Resp: 14 SpO2: 99 % O2 Device: None (Room air) Oxygen Delivery: 6 LPM  Vitals:    02/20/25 0224 02/20/25 0537   Weight: 81.5 kg (179 lb 10.8 oz) 80.3 kg (177 lb 0.5 oz)     Vital Signs with Ranges  Temp:  [97  F (36.1  C)-99.4  F (37.4  C)] 97  F (36.1  C)  Pulse:  [67-83] 73  Resp:  [8-18] 14  BP: ()/(60-91)  113/72  Cuff Mean (mmHg):  [] 84  SpO2:  [95 %-100 %] 99 %  No intake/output data recorded.    Constitutional: Awake, alert, cooperative, no apparent distress, and appears stated age.  Respiratory: No increased work of breathing, good air exchange, clear to auscultation bilaterally, no crackles or wheezing.  Cardiovascular: Normal apical impulse,normal S1 and S2,   GI: , normal bowel sounds, soft, non-distended, non-tender    Discharge Disposition   Discharged to home    Consultations This Hospital Stay   SURGERY GENERAL IP CONSULT    Time Spent on this Encounter   I, Caitlin Seaman MD, personally saw the patient today and spent greater than 30 minutes discharging this patient.    Discharge Orders      Reason for your hospital stay    Please refer to discharge summary admitted for acute cholecystitis     Activity    Your activity upon discharge: activity as tolerated     When to call - Contact Surgeon Team    You may experience symptoms that require follow-up before your scheduled appointment. Contact your Surgeon Team if you are concerned about pain control, large amount of bleeding, constipation, or if you experience signs of infection (fever, growing tenderness at the surgery site, a large amount of drainage, severe pain, foul-smelling drainage, redness or swelling).     No driving or operating machinery    Do NOT drive any vehicle or operate mechanical equipment for 24 hours following the end of your surgery.  Even though you may feel normal, your reactions may be affected by Anesthesia medication you received.     No Alcohol    Do NOT drink alcoholic beverages for 24 hours following your surgery and while taking pain medications.     Diet Instructions    You may drink clear liquids (apple juice, ginger ale, 7-up, broth, etc.) and progress to your regular diet as you feel able. It is important to stay well-hydrated after surgery and drink plenty of water.    If you develop loose stools following  surgery, follow a low fat diet for 1-2 months, then begin to slowly re-introduce fats to your diet.     Shower/Bathing - Restrictions (specify)    Shower/Bathing - Starting after 24 hours, you may shower. Let water run over incisions and pat dry. Do NOT soak incision in tub or go swimming in a lake/pool for 2 weeks postoperatively.     Ice to affected area    Ice to operative site PRN     Discharge Instructions - Comfort and Pain Management    Pain after surgery is normal and expected. You will have some amount of pain after surgery. Your pain will improve with time. There are several things you can do to help reduce your pain including: rest, ice, and using pain medications as needed. Use pain interventions and don't wait until pain level is out of control. Take over the counter pain medications such as acetaminophen (Tylenol) and ibuprofen (Motrin/Advil) in doses recommended by your surgeon.    After laparoscopic surgery, some patients experience shoulder pain. This is referred pain from stretch of the abdominal wall and diaphragm during surgery. Typically this pain resolves after about 48 hours.     Contact your Surgeon Team if you have pain that persists or worsens after surgery despite rest, ice, and taking your medications as prescribed. You may have a dry mouth, a sore throat, muscles aches or trouble sleeping, and these symptoms should go away after 24 hours.     Discharge Instructions - Rest    Rest and relax for the next 24 hours. Make arrangements to have someone stay with you overnight, and avoid hazardous and strenuous activities.  Do NOT make any important decisions for the next 24 hours.     Discharge Instructions - Follow-up Appointment (Weeks)    FOLLOW-UP AFTER SURGERY:  - Our office will contact you approximately 2-3 weeks after surgery to check on your progress and answer any questions you may have.  If you are doing well, you will not need to return for an office appointment.  If any concerns  are identified over the phone, we will help you make an appointment to see a provider.      - If you have not received a phone call, have any questions or concerns, or would like to be seen, please call us at [phone].  We are located at: [address] [facility], [address]; [place] [address]     No lifting    No lifting over 20 pounds and no strenuous physical activity for 2 weeks.     Incision Care    Keep dressing clean and dry.  Wash your hands with soap and warm water before you touch the site of surgery. You have sterile surgical tape called Steri-strips on your surgical sites.  Leave them in place until they fall off on their own (typically 7-14 days). You have stitches underneath the skin which will dissolve over several weeks. Do not apply any creams or lotions to skin until steri-strips are off and incisions are completely healed.     Follow-up and recommended labs and tests     Follow up with primary care provider, [facility], within 7 days for hospital follow- up. 1 week with repeat cbc. Had inflammation due to acute cholecystitis  Follow up with surgery as scheduled     Diet    Follow this diet upon discharge: Current Diet:Orders Placed This Encounter    As per post op reccommendations     Discharge Medications   Current Discharge Medication List        START taking these medications    Details   acetaminophen (TYLENOL) 500 MG tablet Take 1-2 tablets (500-1,000 mg) by mouth every 6 hours as needed for mild pain.    Associated Diagnoses: Acute cholecystitis      ibuprofen (ADVIL/MOTRIN) 200 MG tablet Take 3 tablets (600 mg) by mouth every 6 hours as needed for moderate pain.    Associated Diagnoses: Acute cholecystitis      oxyCODONE (ROXICODONE) 5 MG tablet Take 1-2 tablets (5-10 mg) by mouth every 4 hours as needed for moderate to severe pain.  Qty: 12 tablet, Refills: 0    Associated Diagnoses: Acute cholecystitis           Allergies   Allergies   Allergen Reactions     Amoxicillin Rash    Erythromycin Diarrhea, GI Disturbance, Nausea and Nausea and Vomiting     Data   Most Recent 3 CBC's:  Recent Labs   Lab Test 02/20/25  0309   WBC 12.3*   HGB 12.3   MCV 84         Most Recent 3 BMP's:  Recent Labs   Lab Test 02/20/25  0309   *   POTASSIUM 3.7   CHLORIDE 102   CO2 24   BUN 11.7   CR 0.63   ANIONGAP 7   AMILCAR 9.2   *     Most Recent 2 LFT's:  Recent Labs   Lab Test 02/20/25  0309   AST 17   ALT 14   ALKPHOS 48   BILITOTAL 0.2     Most Recent INR's and Anticoagulation Dosing History:  Anticoagulation Dose History           No data to display              Most Recent 3 Troponin's:No lab results found.  Most Recent Cholesterol Panel:No lab results found.  Most Recent 6 Bacteria Isolates From Any Culture (See EPIC Reports for Culture Details):No lab results found.  Most Recent TSH, T4 and A1c Labs:No lab results found.  Results for orders placed or performed during the hospital encounter of 02/20/25   US Abdomen Limited (RUQ)    Narrative    EXAM: US ABDOMEN LIMITED  LOCATION: [facility]  DATE: 2/20/2025    INDICATION: ruq pain  COMPARISON: None.  TECHNIQUE: Limited abdominal ultrasound.    FINDINGS:    GALLBLADDER: Cholelithiasis. The gallbladder is distended. Mild wall thickening, 4 mm. Sonographic Araiza's sign indeterminate due to medication.    BILE DUCTS: No biliary dilatation. The common duct measures 4 mm.    LIVER: Grossly within normal limits where seen. The portal vein is patent with flow in the normal direction.    RIGHT KIDNEY: No hydronephrosis.    PANCREAS: Obscured by bowel gas.    No visible ascites.      Impression    IMPRESSION:  1.  Cholelithiasis and gallbladder distention. Mild gallbladder wall thickening.  2.  Sonographic Araiza's sign indeterminate due to pain medication. Correlate to exclude acute cholecystitis.  3.  No biliary dilatation.               Disclaimer: This note consists of symbols derived from keyboarding,  dictation and/or voice recognition software. As a result, there may be errors in the script that have gone undetected. Please consider this when interpreting information found in this chart.

## 2025-02-20 NOTE — ANESTHESIA PREPROCEDURE EVALUATION
Anesthesia Pre-Procedure Evaluation    Patient: Sola Brannon   MRN: [id number] : 1980        Procedure : Procedure(s):  LAPAROSCOPIC CHOLECYSTECTOMY          Past Medical History:   Diagnosis Date    LIZBETH (generalized anxiety disorder)     MDD (major depressive disorder)       Past Surgical History:   Procedure Laterality Date    C/SECTION, CLASSICAL        Allergies   Allergen Reactions    Amoxicillin Rash    Erythromycin Diarrhea, GI Disturbance, Nausea and Nausea and Vomiting      Social History     Tobacco Use    Smoking status: Never    Smokeless tobacco: Never   Substance Use Topics    Alcohol use: Not Currently      Wt Readings from Last 1 Encounters:   25 80.3 kg (177 lb 0.5 oz)        Anesthesia Evaluation   Pt has had prior anesthetic.     No history of anesthetic complications       ROS/MED HX  ENT/Pulmonary:     (+)                tobacco use, Current use,                    (-) COPD   Neurologic:       Cardiovascular: Comment: Propranolol for anxiety      METS/Exercise Tolerance:     Hematologic:       Musculoskeletal:       GI/Hepatic:     (+)          cholecystitis/cholelithiasis,          Renal/Genitourinary:       Endo:       Psychiatric/Substance Use:       Infectious Disease:       Malignancy:       Other:            Physical Exam    Airway        Mallampati: I   TM distance: > 3 FB   Neck ROM: full   Mouth opening: > 3 cm    Respiratory Devices and Support         Dental       (+) Minor Abnormalities - some fillings, tiny chips      Cardiovascular   cardiovascular exam normal          Pulmonary   pulmonary exam normal                OUTSIDE LABS:  CBC:   Lab Results   Component Value Date    WBC 12.3 (H) 2025    HGB 12.3 2025    HCT 37.1 2025     2025     BMP:   Lab Results   Component Value Date     (L) 2025    POTASSIUM 3.7 2025    CHLORIDE 102 2025    CO2 24 2025    BUN 11.7 2025    CR 0.63 2025    GLC  "118 (H) 02/20/2025     COAGS: No results found for: \"PTT\", \"INR\", \"FIBR\"  POC: No results found for: \"BGM\", \"HCG\", \"HCGS\"  HEPATIC:   Lab Results   Component Value Date    ALBUMIN 4.1 02/20/2025    PROTTOTAL 7.1 02/20/2025    ALT 14 02/20/2025    AST 17 02/20/2025    ALKPHOS 48 02/20/2025    BILITOTAL 0.2 02/20/2025     OTHER:   Lab Results   Component Value Date    AMILCAR 9.2 02/20/2025    LIPASE 31 02/20/2025       Anesthesia Plan    ASA Status:  2    NPO Status:  NPO Appropriate    Anesthesia Type: General.     - Airway: ETT   Induction: Intravenous, Propofol.   Maintenance: Balanced.        Consents    Anesthesia Plan(s) and associated risks, benefits, and realistic alternatives discussed. Questions answered and patient/representative(s) expressed understanding.     - Discussed:     - Discussed with:  Patient      - Extended Intubation/Ventilatory Support Discussed: No.      - Patient is DNR/DNI Status: No          Postoperative Care    Pain management: IV analgesics, Oral pain medications, Multi-modal analgesia.   PONV prophylaxis: Ondansetron (or other 5HT-3), Dexamethasone or Solumedrol, Background Propofol Infusion     Comments:               Mark Galaviz MD    I have reviewed the pertinent notes and labs in the chart from the past 30 days and (re)examined the patient.  Any updates or changes from those notes are reflected in this note.    Clinically Significant Risk Factors Present on Admission         # Hyponatremia: Lowest Na = 133 mmol/L in last 2 days, will monitor as appropriate                     # Overweight: Estimated body mass index is 29.46 kg/m  as calculated from the following:    Height as of this encounter: 1.651 m (5' 5\").    Weight as of this encounter: 80.3 kg (177 lb 0.5 oz).                "

## 2025-02-20 NOTE — DISCHARGE INSTRUCTIONS
Return to the ED if you are unable to tolerate fluids, intractable nausea or vomiting, severe abdominal pain, fevers >101 or other acute changes.  Please follow up with your PCP in 2-3 days.          Maximum acetaminophen (Tylenol) dose from all sources should not exceed 4 grams (4000 mg) per day.  You received 975 mg at 9 am.            Dr. Riley  [facility] [phone]

## 2025-02-20 NOTE — PLAN OF CARE
" Updates: Pt. Is 44 year old female admitted for acute cholecystitis. Surgical team met with her and confirmed schedule for a laparoscopic cholecystectomy. CHG wipes applied and surgical gown changed. Pt rested comfortably between cares.  VSS. Afebrile, VSS  HEENT: WDL  Resp: WDL  Skin: WDL  GI/: Pain in abdomen radiating to back, RUQ tender. Voiding WDL, no BM this shift. Passing gas. Normoactive BS. Denies N/V. NPO  IV: WDL, IV SL.   Pain/Comfort: Denies pain.   Family: Communicated scheduled surgery with .   Plan: Pt. To PACU for Laparoscopic Cholecystectomy at 0847.      Problem: Adult Inpatient Plan of Care  Goal: Plan of Care Review  Description: The Plan of Care Review/Shift note should be completed every shift.  The Outcome Evaluation is a brief statement about your assessment that the patient is improving, declining, or no change.  This information will be displayed automatically on your shift  note.  Outcome: Progressing  Goal: Patient-Specific Goal (Individualized)  Description: You can add care plan individualizations to a care plan. Examples of Individualization might be:  \"Parent requests to be called daily at 9am for status\", \"I have a hard time hearing out of my right ear\", or \"Do not touch me to wake me up as it startles  me\".  Outcome: Progressing  Goal: Absence of Hospital-Acquired Illness or Injury  Outcome: Progressing  Intervention: Identify and Manage Fall Risk  Recent Flowsheet Documentation  Taken 2/20/2025 0800 by Nessa Marmolejo  Safety Promotion/Fall Prevention:   clutter free environment maintained   nonskid shoes/slippers when out of bed   patient and family education   room organization consistent   safety round/check completed   treat reversible contributory factors   treat underlying cause  Intervention: Prevent Skin Injury  Recent Flowsheet Documentation  Taken 2/20/2025 0800 by Nessa Marmolejo  Body Position: position changed independently  Skin Protection:   adhesive use " limited   tubing/devices free from skin contact  Intervention: Prevent and Manage VTE (Venous Thromboembolism) Risk  Recent Flowsheet Documentation  Taken 2/20/2025 0800 by Nessa Marmolejo  VTE Prevention/Management: SCDs off (sequential compression devices)  Intervention: Prevent Infection  Recent Flowsheet Documentation  Taken 2/20/2025 0800 by Nessa Marmolejo  Infection Prevention:   equipment surfaces disinfected   hand hygiene promoted   rest/sleep promoted   single patient room provided  Goal: Optimal Comfort and Wellbeing  Outcome: Progressing  Goal: Readiness for Transition of Care  Outcome: Progressing   Goal Outcome Evaluation:

## 2025-02-20 NOTE — ED NOTES
"[facility]  ED Nurse Handoff Report    ED Chief complaint: Abdominal Pain and Back Pain  . ED Diagnosis:   Final diagnoses:   Right upper quadrant abdominal pain   Bandemia   Acute cholecystitis       Allergies:   Allergies   Allergen Reactions    Amoxicillin Rash    Erythromycin Diarrhea, GI Disturbance, Nausea and Nausea and Vomiting       Code Status: Full Code    Activity level - Baseline/Home:  independent.  Activity Level - Current:   assist of 1.   Lift room needed: No.   Bariatric: No   Needed: No   Isolation: No.   Infection: Not Applicable.     Respiratory status: Room air    Vital Signs (within 30 minutes):   Vitals:    25 0224   BP: 121/89   Pulse: 83   Resp: 18   Temp: 98.2  F (36.8  C)   TempSrc: Oral   SpO2: 99%   Weight: 81.5 kg (179 lb 10.8 oz)   Height: 1.651 m (5' 5\")       Cardiac Rhythm:  ,      Pain level:    Patient confused: No.   Patient Falls Risk: bed/chair alarm on, arm band in place, activity supervised, and room door open.   Elimination Status: can void, has yet to void    Patient Report - Initial Complaint: abdominal pain.   Focused Assessment: Sola Brannon is a 44 year old female who presents to the emergency department with upper abdominal discomfort.  Patient reports having developed discomfort at 8 PM.  She tried to stretch, tried Pepto-Bismol.  Reports no change in stools from her baseline.  No vomiting or diarrhea.  No fevers.  No chest pain or shortness of breath.  Feels in her epigastrium wrapping around to the sides as well.  Developed some low back pain.  No dysuria or frequency.  Reports having had a tubal ligation in the past.  Prior  but otherwise no other history of abdominal surgeries.  Occasional alcohol use, is a smoker.      Abnormal Results:   Labs Ordered and Resulted from Time of ED Arrival to Time of ED Departure   COMPREHENSIVE METABOLIC PANEL - Abnormal       Result Value    Sodium 133 (*)     Potassium 3.7  "     Carbon Dioxide (CO2) 24      Anion Gap 7      Urea Nitrogen 11.7      Creatinine 0.63      GFR Estimate >90      Calcium 9.2      Chloride 102      Glucose 118 (*)     Alkaline Phosphatase 48      AST 17      ALT 14      Protein Total 7.1      Albumin 4.1      Bilirubin Total 0.2     CBC WITH PLATELETS AND DIFFERENTIAL - Abnormal    WBC Count 12.3 (*)     RBC Count 4.43      Hemoglobin 12.3      Hematocrit 37.1      MCV 84      MCH 27.8      MCHC 33.2      RDW 13.2      Platelet Count 310      % Neutrophils 78      % Lymphocytes 15      % Monocytes 5      % Eosinophils 1      % Basophils 0      % Immature Granulocytes 0      NRBCs per 100 WBC 0      Absolute Neutrophils 9.6 (*)     Absolute Lymphocytes 1.9      Absolute Monocytes 0.6      Absolute Eosinophils 0.2      Absolute Basophils 0.0      Absolute Immature Granulocytes 0.1      Absolute NRBCs 0.0     LIPASE - Normal    Lipase 31     ROUTINE UA WITH MICROSCOPIC REFLEX TO CULTURE        US Abdomen Limited (RUQ)   Final Result   IMPRESSION:   1.  Cholelithiasis and gallbladder distention. Mild gallbladder wall thickening.   2.  Sonographic Araiza's sign indeterminate due to pain medication. Correlate to exclude acute cholecystitis.   3.  No biliary dilatation.                Treatments provided: see MAR  Family Comments: visiting  OBS brochure/video discussed/provided to patient:  N/A  ED Medications:   Medications   ondansetron (ZOFRAN) injection 4 mg (has no administration in time range)   acetaminophen (TYLENOL) tablet 650 mg (has no administration in time range)     Or   acetaminophen (TYLENOL) Suppository 650 mg (has no administration in time range)   melatonin tablet 5 mg (has no administration in time range)   senna-docusate (SENOKOT-S/PERICOLACE) 8.6-50 MG per tablet 1 tablet (has no administration in time range)     Or   senna-docusate (SENOKOT-S/PERICOLACE) 8.6-50 MG per tablet 2 tablet (has no administration in time range)   ondansetron (ZOFRAN  ODT) ODT tab 4 mg (has no administration in time range)     Or   ondansetron (ZOFRAN) injection 4 mg (has no administration in time range)   prochlorperazine (COMPAZINE) injection 10 mg (has no administration in time range)     Or   prochlorperazine (COMPAZINE) tablet 10 mg (has no administration in time range)   nicotine (COMMIT) lozenge 2 mg (has no administration in time range)   HYDROmorphone (DILAUDID) tablet 2 mg (has no administration in time range)   HYDROmorphone (PF) (DILAUDID) injection 0.5 mg (has no administration in time range)   cefTRIAXone (ROCEPHIN) 2 g vial to attach to  ml bag for ADULTS or NS 50 ml bag for PEDS (has no administration in time range)   lactated ringers infusion (has no administration in time range)   ketorolac (TORADOL) injection 10 mg (10 mg Intravenous $Given 2/20/25 0308)   sodium chloride 0.9% BOLUS 1,000 mL (1,000 mLs Intravenous $New Bag 2/20/25 0305)       Drips infusing:  No  For the majority of the shift this patient was Green.   Interventions performed were see notes.    Sepsis treatment initiated: No    Cares/treatment/interventions/medications to be completed following ED care: N/A    ED Nurse Name: Lynda Griffith RN  4:34 AM     RECEIVING UNIT ED HANDOFF REVIEW    Above ED Nurse Handoff Report was reviewed: Yes  Reviewed by: Brittney Ordoñez RN on February 20, 2025 at 5:11 AM   I Angel called the ED to inform them the note was read: Yes

## 2025-02-20 NOTE — ANESTHESIA POSTPROCEDURE EVALUATION
Patient: Sola Brannon    Procedure: Procedure(s):  LAPAROSCOPIC CHOLECYSTECTOMY       Anesthesia Type:  General    Note:  Disposition: Outpatient   Postop Pain Control: Uneventful            Sign Out: Well controlled pain   PONV: No   Neuro/Psych: Uneventful            Sign Out: Acceptable/Baseline neuro status   Airway/Respiratory: Uneventful            Sign Out: Acceptable/Baseline resp. status   CV/Hemodynamics: Uneventful            Sign Out: Acceptable CV status   Other NRE: NONE   DID A NON-ROUTINE EVENT OCCUR? No           Last vitals:  Vitals Value Taken Time   /72 02/20/25 1130   Temp 97  F (36.1  C) 02/20/25 1045   Pulse 77 02/20/25 1137   Resp 10 02/20/25 1137   SpO2 100 % 02/20/25 1138   Vitals shown include unfiled device data.    Electronically Signed By: Hernandez Estevez MD  February 20, 2025  2:31 PM

## 2025-02-20 NOTE — ANESTHESIA PROCEDURE NOTES
Airway       Patient location during procedure: OR       Procedure Start/Stop Times: 2/20/2025 9:34 AM  Staff -        CRNA: Raya Fay APRN CRNA       Performed By: CRNA  Consent for Airway        Urgency: elective  Indications and Patient Condition       Indications for airway management: jostin-procedural       Induction type:intravenous       Mask difficulty assessment: 2 - vent by mask + OA or adjuvant +/- NMBA    Final Airway Details       Final airway type: endotracheal airway       Successful airway: ETT - single  Endotracheal Airway Details        ETT size (mm): 7.0       Cuffed: yes       Successful intubation technique: direct laryngoscopy       DL Blade Type: Lombardi 2       Grade View of Cords: 1       Adjucts: stylet       Position: Right       Measured from: gums/teeth       Secured at (cm): 22       Bite block used: None    Post intubation assessment        Placement verified by: capnometry, equal breath sounds and chest rise        Number of attempts at approach: 1       Number of other approaches attempted: 0       Secured with: plastic tape       Ease of procedure: easy       Dentition: Unchanged    Medication(s) Administered   Medication Administration Time: 2/20/2025 9:34 AM

## 2025-02-20 NOTE — PLAN OF CARE
"Goal Outcome Evaluation:      Plan of Care Reviewed With: patient    Overall Patient Progress: no change    Orientation: Alert and oriented x4  VSS. 98% on RA. afebrile.   LS: clear and equal bilaterally.  GI: Passing gas. no BM. Hypoactive bowel sounds. Denies N/V.   : Adequate urine output.   Skin: intact  Activity: Independent.  Pain: 7/10 abdominal pain radiating into back. Received oral dilaudid just prior to arrival on unit, declining further interventions at this time. Pt encouraged to call if pain not improving. Pt endorses understanding.  Updates/Plan: NPO: ice chips pending surgery consult. Continue with current cares.           Problem: Adult Inpatient Plan of Care  Goal: Plan of Care Review  Description: The Plan of Care Review/Shift note should be completed every shift.  The Outcome Evaluation is a brief statement about your assessment that the patient is improving, declining, or no change.  This information will be displayed automatically on your shift  note.  Outcome: Progressing  Flowsheets (Taken 2/20/2025 8047)  Plan of Care Reviewed With: patient  Overall Patient Progress: no change  Goal: Patient-Specific Goal (Individualized)  Description: You can add care plan individualizations to a care plan. Examples of Individualization might be:  \"Parent requests to be called daily at 9am for status\", \"I have a hard time hearing out of my right ear\", or \"Do not touch me to wake me up as it startles  me\".  Outcome: Progressing  Goal: Absence of Hospital-Acquired Illness or Injury  Outcome: Progressing  Intervention: Identify and Manage Fall Risk  Recent Flowsheet Documentation  Taken 2/20/2025 8052 by Brittney Zapien RN  Safety Promotion/Fall Prevention:   clutter free environment maintained   nonskid shoes/slippers when out of bed   patient and family education   room organization consistent   safety round/check completed   treat reversible contributory factors   treat underlying " cause  Intervention: Prevent Skin Injury  Recent Flowsheet Documentation  Taken 2/20/2025 0537 by Brittney Zapien RN  Body Position: position changed independently  Skin Protection:   adhesive use limited   tubing/devices free from skin contact  Intervention: Prevent and Manage VTE (Venous Thromboembolism) Risk  Recent Flowsheet Documentation  Taken 2/20/2025 0537 by Brittney Zapien RN  VTE Prevention/Management: SCDs off (sequential compression devices)  Intervention: Prevent Infection  Recent Flowsheet Documentation  Taken 2/20/2025 0537 by Brittney Zapien RN  Infection Prevention:   equipment surfaces disinfected   hand hygiene promoted   rest/sleep promoted   single patient room provided  Goal: Optimal Comfort and Wellbeing  Outcome: Progressing  Intervention: Monitor Pain and Promote Comfort  Recent Flowsheet Documentation  Taken 2/20/2025 0537 by Brittney Zapien RN  Pain Management Interventions: (medication given in ED just prior to arrival on unit, pt declining further interventions at this time) other (see comments)  Goal: Readiness for Transition of Care  Outcome: Progressing  Intervention: Mutually Develop Transition Plan  Recent Flowsheet Documentation  Taken 2/20/2025 0541 by Brittney Zapien RN  Equipment Currently Used at Home: none     Problem: Pain Acute  Goal: Optimal Pain Control and Function  Outcome: Progressing  Intervention: Develop Pain Management Plan  Recent Flowsheet Documentation  Taken 2/20/2025 0537 by Brittney Zapien RN  Pain Management Interventions: (medication given in ED just prior to arrival on unit, pt declining further interventions at this time) other (see comments)  Intervention: Prevent or Manage Pain  Recent Flowsheet Documentation  Taken 2/20/2025 0537 by Brittney Zapien RN  Sensory Stimulation Regulation:   auditory stimulation minimized   care clustered   lighting decreased   quiet environment promoted   visual  stimulation minimized  Sleep/Rest Enhancement:   awakenings minimized   consistent schedule promoted   noise level reduced   regular sleep/rest pattern promoted   relaxation techniques promoted   room darkened  Bowel Elimination Promotion:   adequate fluid intake promoted   ambulation promoted  Medication Review/Management:   medications reviewed   high-risk medications identified

## 2025-02-20 NOTE — H&P
[facility]       Hospitalist History & Physical     Assessment & Plan     ASSESSMENT    44F with history of MDD and LIZBETH presents with right upper quadrant abdominal pain and found to have acute cholecystitis.  Workup and treatment as per below.    PLAN    Acute Cholecystitis  -Pain regimen in place  -Ceftriaxone 2 g daily  -LR@100ml/hr  -NPO for general surgery consultation and likely lap eliezer    MDD, LIZBETH, & PTSD  -Home medications    Hyponatremia  -IVF and trend    DVT Prophy  -SCDs    Disposition  -Medically Ready for Discharge: Anticipated Today       Riky Aguillon MD    History of Present Illness     Sola Brannon is a 44 year old with history of MDD and LIZBETH presents with abdominal pain.  Patient was in her normal state of health until this past evening when she started experiencing right upper quadrant abdominal pain.  Patient was in her normal state of health until this evening when she started experiencing epigastric pain that radiated to her right upper quadrant.  Did not start after eating.  Tried stretching and taking Pepto-Bismol without relief.  Has had several of these episodes in the past year but this 1 was much more intense.  Has had nausea and vomiting but denies fevers, chills, or diarrhea.  No sick contacts. In the ED, VSS.  WBC 12.3.  Abdominal ultrasound with evidence of cholelithiasis and gallbladder wall thickening.  Patient started on antibiotics and admitted to medicine.    Review of Systems     A Comprehensive greater than 10 system review of systems was carried out.  Pertinent positives and negatives are noted above.  Otherwise negative for contributory information.     Past Medical History     Past Medical History:   Diagnosis Date    LIZBETH (generalized anxiety disorder)     MDD (major depressive disorder)      Medications     LORazepam (ATIVAN) 1 MG tablet   Indications: Posttraumatic stress disorder (HRC), Adjustment disorder with anxiety (HRC) Take 1 Tablet (1 mg)  "by mouth daily as needed for Anxiety for up to 5 doses. 5 Tablet      07/31/2024     buPROPion (WELLBUTRIN XL) 150 MG 24 hour release tablet   Indications: Depression, unspecified depression type Take 1 Tablet (150 mg) by mouth daily. 90 Tablet      07/31/2024 09/23/2024   venlafaxine (EFFEXORXR) 150 MG 24 hour release capsule   Indications: Posttraumatic stress disorder (HRC), Depression, unspecified depression type, Adjustment disorder with anxiety (HRC) Take 1 Capsule (150 mg) by mouth daily. 90 Capsule  3   07/31/2024 01/15/2025   traZODone (DESYREL) 100 MG tablet   Indications: Posttraumatic stress disorder (HRC), Depression, unspecified depression type, Adjustment disorder with anxiety (HRC)           Past Surgical History     Past Surgical History:   Procedure Laterality Date    C/SECTION, CLASSICAL       Family History   History reviewed. No pertinent family history.    Allergies     Allergies   Allergen Reactions    Amoxicillin Rash    Erythromycin Diarrhea, GI Disturbance, Nausea and Nausea and Vomiting     Social History     Social History     Tobacco Use    Smoking status: Never    Smokeless tobacco: Never   Substance Use Topics    Alcohol use: Not Currently     Physical Exam   Blood pressure 121/89, pulse 83, temperature 98.2  F (36.8  C), temperature source Oral, resp. rate 18, height 1.651 m (5' 5\"), weight 81.5 kg (179 lb 10.8 oz), last menstrual period 01/24/2025, SpO2 99%.    General: Ill appearing, cooperative with exam, in NAD.  HEENT: Atraumatic. No erythema in posterior pharynx.  Lymph: No cervical or inguinal lymphadenopathy.  Cardiac: RRR. No murmurs.  Lungs: CTAB. Nl WOB.  Abd: Mild right upper quadrant tenderness.  No rebound or gaurding. Nl bowel sounds.  Ext: No edema. 2+ pulses.  Skin: No rashes, abrasions, or contusions.  Psych: A&Ox3. Nl affect.  Neuro: 5/5 strength. Sensation intact.    Labs & Imaging     Reviewed and Pertinent results discussed in assessment and plan.      "

## 2025-02-20 NOTE — ANESTHESIA CARE TRANSFER NOTE
Patient: Sola Brannon    Procedure: Procedure(s):  LAPAROSCOPIC CHOLECYSTECTOMY       Diagnosis: Right upper quadrant abdominal pain [R10.11]  Diagnosis Additional Information: No value filed.    Anesthesia Type:   General     Note:    Oropharynx: oral airway in place  Level of Consciousness: drowsy  Oxygen Supplementation: face mask  Level of Supplemental Oxygen (L/min / FiO2): 6  Independent Airway: airway patency satisfactory and stable  Dentition: dentition unchanged  Vital Signs Stable: post-procedure vital signs reviewed and stable  Report to RN Given: handoff report given  Patient transferred to: PACU    Handoff Report: Identifed the Patient, Identified the Reponsible Provider, Reviewed the pertinent medical history, Discussed the surgical course, Reviewed Intra-OP anesthesia mangement and issues during anesthesia, Set expectations for post-procedure period and Allowed opportunity for questions and acknowledgement of understanding      Vitals:  Vitals Value Taken Time   BP 96/65 02/20/25 1030   Temp 76.64  F (24.8  C) 02/20/25 1031   Pulse 68 02/20/25 1036   Resp 14 02/20/25 1036   SpO2 100 % 02/20/25 1036   Vitals shown include unfiled device data.    Electronically Signed By: YOANA Guerrero CRNA  February 20, 2025  10:37 AM

## 2025-02-24 LAB
PATH REPORT.COMMENTS IMP SPEC: NORMAL
PATH REPORT.COMMENTS IMP SPEC: NORMAL
PATH REPORT.FINAL DX SPEC: NORMAL
PATH REPORT.GROSS SPEC: NORMAL
PATH REPORT.MICROSCOPIC SPEC OTHER STN: NORMAL
PATH REPORT.RELEVANT HX SPEC: NORMAL
PHOTO IMAGE: NORMAL

## 2025-03-13 ENCOUNTER — TELEPHONE (OUTPATIENT)
Dept: SURGERY | Facility: CLINIC | Age: 45
End: 2025-03-13
Payer: COMMERCIAL

## 2025-03-13 NOTE — TELEPHONE ENCOUNTER
Attempted to call patient for post op check.  No answer.  Message was left for patient to call back if they had any questions of concerns.     Latha Arce PA-C

## 2025-06-29 ENCOUNTER — HEALTH MAINTENANCE LETTER (OUTPATIENT)
Age: 45
End: 2025-06-29

## (undated) DEVICE — MANIFOLD NEPTUNE 4 PORT 700-20

## (undated) DEVICE — SU VICRYL 4-0 PS-2 18" UND J496H

## (undated) DEVICE — LINEN FULL SHEET 5511

## (undated) DEVICE — ESU PENCIL W/HOLSTER E2350H

## (undated) DEVICE — ESU GROUND PAD ADULT W/CORD E7507

## (undated) DEVICE — SOL NACL 0.9% INJ 1000ML BAG 2B1324X

## (undated) DEVICE — ENDO TROCAR SLEEVE KII Z-THREADED 05X100MM CTS02

## (undated) DEVICE — ENDO POUCH UNIV RETRIEVAL SYSTEM INZII 10MM CD001

## (undated) DEVICE — LINEN POUCH DBL 5427

## (undated) DEVICE — Device

## (undated) DEVICE — ENDO TROCAR FIRST ENTRY KII FIOS Z-THRD 05X100MM CTF03

## (undated) DEVICE — LINEN HALF SHEET 5512

## (undated) DEVICE — CLIP APPLIER ENDO 5MM M/L LIGAMAX EL5ML

## (undated) DEVICE — SU VICRYL+ 0 27 UR6 VLT VCP603H

## (undated) DEVICE — LINEN TOWEL PACK X10 5473

## (undated) DEVICE — GLOVE BIOGEL PI MICRO SZ 6.5 48565

## (undated) DEVICE — BAG CLEAR TRASH 1.3M 39X33" P4040C

## (undated) DEVICE — SUCTION IRR STRYKERFLOW II W/TIP 250-070-520

## (undated) DEVICE — GLOVE BIOGEL PI MICRO INDICATOR UNDERGLOVE SZ 6.5 48965

## (undated) DEVICE — ENDO TROCAR BLUNT TIP KII BALLOON 12X100MM C0R47

## (undated) DEVICE — ESU CORD MONOPOLAR 10'  E0510

## (undated) DEVICE — ESU ELEC BLADE 2.75" COATED/INSULATED E1455

## (undated) RX ORDER — FENTANYL CITRATE 50 UG/ML
INJECTION, SOLUTION INTRAMUSCULAR; INTRAVENOUS
Status: DISPENSED
Start: 2025-02-20

## (undated) RX ORDER — PROPOFOL 10 MG/ML
INJECTION, EMULSION INTRAVENOUS
Status: DISPENSED
Start: 2025-02-20

## (undated) RX ORDER — ONDANSETRON 2 MG/ML
INJECTION INTRAMUSCULAR; INTRAVENOUS
Status: DISPENSED
Start: 2025-02-20

## (undated) RX ORDER — BUPIVACAINE HYDROCHLORIDE AND EPINEPHRINE 5; 5 MG/ML; UG/ML
INJECTION, SOLUTION EPIDURAL; INTRACAUDAL; PERINEURAL
Status: DISPENSED
Start: 2025-02-20

## (undated) RX ORDER — KETOROLAC TROMETHAMINE 30 MG/ML
INJECTION, SOLUTION INTRAMUSCULAR; INTRAVENOUS
Status: DISPENSED
Start: 2025-02-20

## (undated) RX ORDER — CEFAZOLIN SODIUM/WATER 2 G/20 ML
SYRINGE (ML) INTRAVENOUS
Status: DISPENSED
Start: 2025-02-20

## (undated) RX ORDER — DEXAMETHASONE SODIUM PHOSPHATE 4 MG/ML
INJECTION, SOLUTION INTRA-ARTICULAR; INTRALESIONAL; INTRAMUSCULAR; INTRAVENOUS; SOFT TISSUE
Status: DISPENSED
Start: 2025-02-20

## (undated) RX ORDER — ACETAMINOPHEN 325 MG/1
TABLET ORAL
Status: DISPENSED
Start: 2025-02-20

## (undated) RX ORDER — INDOCYANINE GREEN AND WATER 25 MG
KIT INJECTION
Status: DISPENSED
Start: 2025-02-20

## (undated) RX ORDER — DEXMEDETOMIDINE HYDROCHLORIDE 4 UG/ML
INJECTION, SOLUTION INTRAVENOUS
Status: DISPENSED
Start: 2025-02-20

## (undated) RX ORDER — BUPIVACAINE HYDROCHLORIDE 5 MG/ML
INJECTION, SOLUTION EPIDURAL; INTRACAUDAL
Status: DISPENSED
Start: 2025-02-20

## (undated) RX ORDER — GLYCOPYRROLATE 0.2 MG/ML
INJECTION, SOLUTION INTRAMUSCULAR; INTRAVENOUS
Status: DISPENSED
Start: 2025-02-20

## (undated) RX ORDER — LIDOCAINE HYDROCHLORIDE 10 MG/ML
INJECTION, SOLUTION EPIDURAL; INFILTRATION; INTRACAUDAL; PERINEURAL
Status: DISPENSED
Start: 2025-02-20